# Patient Record
Sex: MALE | URBAN - METROPOLITAN AREA
[De-identification: names, ages, dates, MRNs, and addresses within clinical notes are randomized per-mention and may not be internally consistent; named-entity substitution may affect disease eponyms.]

---

## 2023-01-04 ENCOUNTER — WEB ENCOUNTER (OUTPATIENT)
Dept: URBAN - METROPOLITAN AREA CLINIC 35 | Facility: CLINIC | Age: 48
End: 2023-01-04

## 2023-01-05 ENCOUNTER — OFFICE VISIT (OUTPATIENT)
Dept: URBAN - METROPOLITAN AREA CLINIC 35 | Facility: CLINIC | Age: 48
End: 2023-01-05
Payer: COMMERCIAL

## 2023-01-05 ENCOUNTER — DASHBOARD ENCOUNTERS (OUTPATIENT)
Age: 48
End: 2023-01-05

## 2023-01-05 ENCOUNTER — LAB OUTSIDE AN ENCOUNTER (OUTPATIENT)
Dept: URBAN - METROPOLITAN AREA CLINIC 35 | Facility: CLINIC | Age: 48
End: 2023-01-05

## 2023-01-05 VITALS
DIASTOLIC BLOOD PRESSURE: 88 MMHG | SYSTOLIC BLOOD PRESSURE: 142 MMHG | HEART RATE: 63 BPM | WEIGHT: 203 LBS | OXYGEN SATURATION: 98 % | HEIGHT: 68 IN | BODY MASS INDEX: 30.77 KG/M2

## 2023-01-05 DIAGNOSIS — Z12.12 ENCOUNTER FOR SCREENING FOR MALIGNANT NEOPLASM OF RECTUM: ICD-10-CM

## 2023-01-05 DIAGNOSIS — R19.4 CHANGE IN BOWEL HABITS: ICD-10-CM

## 2023-01-05 DIAGNOSIS — R14.0 ABDOMINAL BLOATING: ICD-10-CM

## 2023-01-05 DIAGNOSIS — Z12.11 ENCOUNTER FOR SCREENING FOR MALIGNANT NEOPLASM OF COLON: ICD-10-CM

## 2023-01-05 PROBLEM — 116289008: Status: ACTIVE | Noted: 2023-01-05

## 2023-01-05 PROBLEM — 305058001: Status: ACTIVE | Noted: 2023-01-05

## 2023-01-05 PROBLEM — 88111009: Status: ACTIVE | Noted: 2023-01-05

## 2023-01-05 PROCEDURE — 99204 OFFICE O/P NEW MOD 45 MIN: CPT | Performed by: INTERNAL MEDICINE

## 2023-01-05 RX ORDER — SODIUM PICOSULFATE, MAGNESIUM OXIDE, AND ANHYDROUS CITRIC ACID 10; 3.5; 12 MG/160ML; G/160ML; G/160ML
ML LIQUID ORAL
Qty: 1 | Refills: 0 | OUTPATIENT
Start: 2023-01-05 | End: 2023-01-06

## 2023-01-05 RX ORDER — LOSARTAN POTASSIUM AND HYDROCHLOROTHIAZIDE 50; 12.5 MG/1; MG/1
TABLET, FILM COATED ORAL
Qty: 90 TABLET | Status: ACTIVE | COMMUNITY

## 2023-01-05 NOTE — HPI-ALTERNATING BOWEL HABITS
Patient presents for change in bowel habits consult . Pt admits symptoms began 05/2021 and happens in waves but is getting worse .Patient admits alternating bowel habits ; 3-4 bowel movements a day withvaryig stools between loose and hard stools.  He admits ntermittent straining to mve bowels . denies taking recent antibiotics. He admits taking OTC metamucil for relief with some relief . Patient denies any recent stools studies . Patient denies blood in stools, melena, or mucus . Patient denies previous colonoscopy .

## 2023-01-05 NOTE — HPI-COLORECTAL CANCER SCREENING
47 year old male patient presents for colorectal cancer screening consult. Patient is here due to age . Patient admits this will be first colonoscopy. Patient denies family hx of colon cancer. Patient denies family hx of colon polyps. Patient admits normal bowel habits at this time. Patient denies any current symptoms of rectal bleeding, melena or mucus. Patient denies any concerns at this time

## 2023-01-05 NOTE — HPI-BLOATING/GAS
47 year old male patient complains of bloating/gas. Patient admits symptoms are more present after meals . Patient admits symptoms have been present since 05/2021 the subsided now it has returned . Patient trying Gas X as needed or pepto bismol OTC medications to relieve symptoms. Patient describes symptoms as feeling full or abdominal distention that feels like pressure in abdomen . He aslo mentions excess belching and flatulance . Patient denies having any previous breath test including H. Pylori or SIBO

## 2023-01-17 PROBLEM — 305058001: Status: ACTIVE | Noted: 2023-01-05

## 2023-02-02 ENCOUNTER — OFFICE VISIT (OUTPATIENT)
Dept: URBAN - METROPOLITAN AREA SURGERY CENTER 8 | Facility: SURGERY CENTER | Age: 48
End: 2023-02-02

## 2023-03-01 ENCOUNTER — OFFICE VISIT (OUTPATIENT)
Dept: URBAN - METROPOLITAN AREA CLINIC 33 | Facility: CLINIC | Age: 48
End: 2023-03-01

## 2023-03-21 ENCOUNTER — OFFICE VISIT (OUTPATIENT)
Dept: URBAN - METROPOLITAN AREA CLINIC 23 | Facility: CLINIC | Age: 48
End: 2023-03-21